# Patient Record
Sex: FEMALE | Race: OTHER | HISPANIC OR LATINO | URBAN - METROPOLITAN AREA
[De-identification: names, ages, dates, MRNs, and addresses within clinical notes are randomized per-mention and may not be internally consistent; named-entity substitution may affect disease eponyms.]

---

## 2017-02-15 ENCOUNTER — INPATIENT (INPATIENT)
Facility: HOSPITAL | Age: 27
LOS: 1 days | Discharge: HOME | DRG: 782 | End: 2017-02-17
Attending: OBSTETRICS & GYNECOLOGY | Admitting: OBSTETRICS & GYNECOLOGY
Payer: COMMERCIAL

## 2017-02-15 VITALS — WEIGHT: 160.94 LBS

## 2017-02-15 VITALS — WEIGHT: 158.73 LBS | HEIGHT: 60 IN

## 2017-02-15 DIAGNOSIS — O44.33 PARTIAL PLACENTA PREVIA WITH HEMORRHAGE, THIRD TRIMESTER: ICD-10-CM

## 2017-02-15 DIAGNOSIS — O44.30: ICD-10-CM

## 2017-02-15 DIAGNOSIS — Z3A.32 32 WEEKS GESTATION OF PREGNANCY: ICD-10-CM

## 2017-02-15 LAB
ALBUMIN SERPL ELPH-MCNC: 2.8 G/DL — LOW (ref 3.4–5)
ALP SERPL-CCNC: 101 U/L — SIGNIFICANT CHANGE UP (ref 40–120)
ALT FLD-CCNC: 11 U/L — LOW (ref 12–42)
ANION GAP SERPL CALC-SCNC: 10 MMOL/L — SIGNIFICANT CHANGE UP (ref 9–16)
APTT BLD: 28.2 SEC — SIGNIFICANT CHANGE UP (ref 27.5–37.4)
AST SERPL-CCNC: 11 U/L — LOW (ref 15–37)
BASOPHILS NFR BLD AUTO: 0.2 % — SIGNIFICANT CHANGE UP (ref 0–2)
BILIRUB SERPL-MCNC: 0.3 MG/DL — SIGNIFICANT CHANGE UP (ref 0.2–1.2)
BLD GP AB SCN SERPL QL: NEGATIVE — SIGNIFICANT CHANGE UP
BUN SERPL-MCNC: 6 MG/DL — LOW (ref 7–23)
CALCIUM SERPL-MCNC: 8.6 MG/DL — SIGNIFICANT CHANGE UP (ref 8.5–10.5)
CHLORIDE SERPL-SCNC: 107 MMOL/L — SIGNIFICANT CHANGE UP (ref 96–108)
CO2 SERPL-SCNC: 22 MMOL/L — SIGNIFICANT CHANGE UP (ref 22–31)
CREAT SERPL-MCNC: 0.52 MG/DL — SIGNIFICANT CHANGE UP (ref 0.5–1.3)
EOSINOPHIL NFR BLD AUTO: 0.5 % — SIGNIFICANT CHANGE UP (ref 0–6)
FIBRINOGEN PPP-MCNC: 708 MG/DL — HIGH (ref 258–438)
GLUCOSE SERPL-MCNC: 90 MG/DL — SIGNIFICANT CHANGE UP (ref 70–99)
HCT VFR BLD CALC: 34.2 % — LOW (ref 34.5–45)
HGB BLD-MCNC: 11.3 G/DL — LOW (ref 11.5–15.5)
INR BLD: 1.04 — SIGNIFICANT CHANGE UP (ref 0.88–1.16)
LYMPHOCYTES # BLD AUTO: 15.7 % — SIGNIFICANT CHANGE UP (ref 13–44)
MCHC RBC-ENTMCNC: 29.8 PG — SIGNIFICANT CHANGE UP (ref 27–34)
MCHC RBC-ENTMCNC: 33 G/DL — SIGNIFICANT CHANGE UP (ref 32–36)
MCV RBC AUTO: 90.2 FL — SIGNIFICANT CHANGE UP (ref 80–100)
MONOCYTES NFR BLD AUTO: 5.5 % — SIGNIFICANT CHANGE UP (ref 2–14)
NEUTROPHILS NFR BLD AUTO: 78.1 % — HIGH (ref 43–77)
PLATELET # BLD AUTO: 229 K/UL — SIGNIFICANT CHANGE UP (ref 150–400)
POTASSIUM SERPL-MCNC: 3.3 MMOL/L — LOW (ref 3.5–5.3)
POTASSIUM SERPL-SCNC: 3.3 MMOL/L — LOW (ref 3.5–5.3)
PROT SERPL-MCNC: 7.3 G/DL — SIGNIFICANT CHANGE UP (ref 6.4–8.2)
PROTHROM AB SERPL-ACNC: 11.5 SEC — SIGNIFICANT CHANGE UP (ref 10–13.1)
RBC # BLD: 3.79 M/UL — LOW (ref 3.8–5.2)
RBC # FLD: 12.8 % — SIGNIFICANT CHANGE UP (ref 10.3–16.9)
RH IG SCN BLD-IMP: POSITIVE — SIGNIFICANT CHANGE UP
RH IG SCN BLD-IMP: POSITIVE — SIGNIFICANT CHANGE UP
SODIUM SERPL-SCNC: 139 MMOL/L — SIGNIFICANT CHANGE UP (ref 135–145)
URATE SERPL-MCNC: 2.9 MG/DL — SIGNIFICANT CHANGE UP (ref 2.6–6)
WBC # BLD: 8.1 K/UL — SIGNIFICANT CHANGE UP (ref 3.8–10.5)
WBC # FLD AUTO: 8.1 K/UL — SIGNIFICANT CHANGE UP (ref 3.8–10.5)

## 2017-02-15 RX ORDER — SODIUM CHLORIDE 9 MG/ML
1000 INJECTION, SOLUTION INTRAVENOUS
Qty: 0 | Refills: 0 | Status: DISCONTINUED | OUTPATIENT
Start: 2017-02-15 | End: 2017-02-16

## 2017-02-15 RX ORDER — SODIUM CHLORIDE 9 MG/ML
500 INJECTION, SOLUTION INTRAVENOUS ONCE
Qty: 0 | Refills: 0 | Status: COMPLETED | OUTPATIENT
Start: 2017-02-15 | End: 2017-02-15

## 2017-02-15 RX ORDER — DOCUSATE SODIUM 100 MG
100 CAPSULE ORAL ONCE
Qty: 0 | Refills: 0 | Status: COMPLETED | OUTPATIENT
Start: 2017-02-15 | End: 2017-02-15

## 2017-02-15 RX ADMIN — Medication 12 MILLIGRAM(S): at 15:00

## 2017-02-15 RX ADMIN — SODIUM CHLORIDE 1000 MILLILITER(S): 9 INJECTION, SOLUTION INTRAVENOUS at 15:16

## 2017-02-15 RX ADMIN — Medication 100 MILLIGRAM(S): at 22:13

## 2017-02-15 RX ADMIN — SODIUM CHLORIDE 125 MILLILITER(S): 9 INJECTION, SOLUTION INTRAVENOUS at 15:16

## 2017-02-16 ENCOUNTER — TRANSCRIPTION ENCOUNTER (OUTPATIENT)
Age: 27
End: 2017-02-16

## 2017-02-16 LAB
APPEARANCE UR: CLEAR — SIGNIFICANT CHANGE UP
BILIRUB UR-MCNC: NEGATIVE — SIGNIFICANT CHANGE UP
COLOR SPEC: YELLOW — SIGNIFICANT CHANGE UP
DIFF PNL FLD: (no result)
GLUCOSE UR QL: NEGATIVE — SIGNIFICANT CHANGE UP
KETONES UR-MCNC: NEGATIVE — SIGNIFICANT CHANGE UP
LEUKOCYTE ESTERASE UR-ACNC: NEGATIVE — SIGNIFICANT CHANGE UP
NITRITE UR-MCNC: NEGATIVE — SIGNIFICANT CHANGE UP
PH UR: 6 — SIGNIFICANT CHANGE UP (ref 4–8)
PROT UR-MCNC: NEGATIVE MG/DL — SIGNIFICANT CHANGE UP
SP GR SPEC: 1.02 — SIGNIFICANT CHANGE UP (ref 1–1.03)
T PALLIDUM AB TITR SER: NEGATIVE — SIGNIFICANT CHANGE UP
UROBILINOGEN FLD QL: 0.2 E.U./DL — SIGNIFICANT CHANGE UP

## 2017-02-16 RX ORDER — FERROUS SULFATE 325(65) MG
325 TABLET ORAL DAILY
Qty: 0 | Refills: 0 | Status: DISCONTINUED | OUTPATIENT
Start: 2017-02-16 | End: 2017-02-17

## 2017-02-16 RX ORDER — DOCUSATE SODIUM 100 MG
100 CAPSULE ORAL
Qty: 0 | Refills: 0 | Status: DISCONTINUED | OUTPATIENT
Start: 2017-02-16 | End: 2017-02-17

## 2017-02-16 RX ORDER — ACETAMINOPHEN 500 MG
650 TABLET ORAL EVERY 6 HOURS
Qty: 0 | Refills: 0 | Status: DISCONTINUED | OUTPATIENT
Start: 2017-02-16 | End: 2017-02-17

## 2017-02-16 RX ADMIN — Medication 30 MILLILITER(S): at 02:30

## 2017-02-16 RX ADMIN — Medication 650 MILLIGRAM(S): at 21:18

## 2017-02-16 RX ADMIN — Medication 650 MILLIGRAM(S): at 22:00

## 2017-02-16 RX ADMIN — Medication 1 TABLET(S): at 10:07

## 2017-02-16 RX ADMIN — Medication 12 MILLIGRAM(S): at 15:09

## 2017-02-16 NOTE — DISCHARGE NOTE ANTEPARTUM - CARE PROVIDER_API CALL
Donna Lentz (MD), Obstetrics and Gynecology  [address] [place] [address]  Phone: [phone]  Fax: [phone]

## 2017-02-16 NOTE — DISCHARGE NOTE ANTEPARTUM - HOSPITAL COURSE
pt admitted for observation after second episode of vaginal bleeding in the setting of known marginal placenta previa, no bleeding noted on physical exam or during admission, initial ctx on monitor resolved after IV and PO hydration,  steroids given, official sonogram revealed resolved marginal previa, pt stable for d/c, precautions given

## 2017-02-16 NOTE — DISCHARGE NOTE ANTEPARTUM - PLAN OF CARE
continue f/u as outpatient w/ Dr. Lentz f/u as instructed per Dr. Lentz, pelvic rest, avoid sexual intercourse

## 2017-02-16 NOTE — DISCHARGE NOTE ANTEPARTUM - CARE PLAN
Principal Discharge DX:	Vaginal bleeding  Goal:	continue f/u as outpatient w/ Dr. Lentz  Instructions for follow-up, activity and diet:	f/u as instructed per Dr. Lentz, pelvic rest, avoid sexual intercourse

## 2017-02-16 NOTE — DISCHARGE NOTE ANTEPARTUM - PATIENT PORTAL LINK FT
“You can access the Follow[employer] Patient Portal, offered by [facility], by registering with the following website: [email or web address]”

## 2017-02-17 LAB
CULTURE RESULTS: NO GROWTH — SIGNIFICANT CHANGE UP
CULTURE RESULTS: SIGNIFICANT CHANGE UP
SPECIMEN SOURCE: SIGNIFICANT CHANGE UP
SPECIMEN SOURCE: SIGNIFICANT CHANGE UP

## 2017-02-17 PROCEDURE — 86850 RBC ANTIBODY SCREEN: CPT

## 2017-02-17 PROCEDURE — 99214 OFFICE O/P EST MOD 30 MIN: CPT

## 2017-02-17 PROCEDURE — 85384 FIBRINOGEN ACTIVITY: CPT

## 2017-02-17 PROCEDURE — 85610 PROTHROMBIN TIME: CPT

## 2017-02-17 PROCEDURE — 87070 CULTURE OTHR SPECIMN AEROBIC: CPT

## 2017-02-17 PROCEDURE — 81003 URINALYSIS AUTO W/O SCOPE: CPT

## 2017-02-17 PROCEDURE — 86780 TREPONEMA PALLIDUM: CPT

## 2017-02-17 PROCEDURE — 81001 URINALYSIS AUTO W/SCOPE: CPT

## 2017-02-17 PROCEDURE — 86900 BLOOD TYPING SEROLOGIC ABO: CPT

## 2017-02-17 PROCEDURE — 80053 COMPREHEN METABOLIC PANEL: CPT

## 2017-02-17 PROCEDURE — 85730 THROMBOPLASTIN TIME PARTIAL: CPT

## 2017-02-17 PROCEDURE — 87086 URINE CULTURE/COLONY COUNT: CPT

## 2017-02-17 PROCEDURE — 84550 ASSAY OF BLOOD/URIC ACID: CPT

## 2017-02-17 PROCEDURE — 86901 BLOOD TYPING SEROLOGIC RH(D): CPT

## 2017-02-17 PROCEDURE — 36415 COLL VENOUS BLD VENIPUNCTURE: CPT

## 2017-02-17 PROCEDURE — 85025 COMPLETE CBC W/AUTO DIFF WBC: CPT

## 2017-02-17 RX ADMIN — Medication 325 MILLIGRAM(S): at 11:44

## 2017-02-17 RX ADMIN — Medication 100 MILLIGRAM(S): at 06:02

## 2017-02-17 RX ADMIN — Medication 1 TABLET(S): at 11:44

## 2017-02-22 DIAGNOSIS — O44.20 PARTIAL PLACENTA PREVIA NOS OR WITHOUT HEMORRHAGE, UNSPECIFIED TRIMESTER: ICD-10-CM

## 2017-02-22 DIAGNOSIS — Z34.80 ENCOUNTER FOR SUPERVISION OF OTHER NORMAL PREGNANCY, UNSPECIFIED TRIMESTER: ICD-10-CM

## 2017-02-22 DIAGNOSIS — Z28.21 IMMUNIZATION NOT CARRIED OUT BECAUSE OF PATIENT REFUSAL: ICD-10-CM

## 2017-03-30 ENCOUNTER — OUTPATIENT (OUTPATIENT)
Dept: OUTPATIENT SERVICES | Facility: HOSPITAL | Age: 27
LOS: 1 days | End: 2017-03-30
Payer: COMMERCIAL

## 2017-03-30 ENCOUNTER — RESULT REVIEW (OUTPATIENT)
Age: 27
End: 2017-03-30

## 2017-03-30 DIAGNOSIS — Z01.818 ENCOUNTER FOR OTHER PREPROCEDURAL EXAMINATION: ICD-10-CM

## 2017-03-30 LAB
ALBUMIN SERPL ELPH-MCNC: 2.8 G/DL — LOW (ref 3.4–5)
ALP SERPL-CCNC: 185 U/L — HIGH (ref 40–120)
ALT FLD-CCNC: 9 U/L — LOW (ref 12–42)
ANION GAP SERPL CALC-SCNC: 8 MMOL/L — LOW (ref 9–16)
APTT BLD: 27.7 SEC — SIGNIFICANT CHANGE UP (ref 27.5–37.4)
AST SERPL-CCNC: 11 U/L — LOW (ref 15–37)
BILIRUB SERPL-MCNC: 0.3 MG/DL — SIGNIFICANT CHANGE UP (ref 0.2–1.2)
BLD GP AB SCN SERPL QL: NEGATIVE — SIGNIFICANT CHANGE UP
BUN SERPL-MCNC: 5 MG/DL — LOW (ref 7–23)
CALCIUM SERPL-MCNC: 8.8 MG/DL — SIGNIFICANT CHANGE UP (ref 8.5–10.5)
CHLORIDE SERPL-SCNC: 105 MMOL/L — SIGNIFICANT CHANGE UP (ref 96–108)
CO2 SERPL-SCNC: 26 MMOL/L — SIGNIFICANT CHANGE UP (ref 22–31)
CREAT SERPL-MCNC: 0.59 MG/DL — SIGNIFICANT CHANGE UP (ref 0.5–1.3)
GLUCOSE SERPL-MCNC: 104 MG/DL — HIGH (ref 70–99)
HCT VFR BLD CALC: 35.4 % — SIGNIFICANT CHANGE UP (ref 34.5–45)
HGB BLD-MCNC: 11.9 G/DL — SIGNIFICANT CHANGE UP (ref 11.5–15.5)
INR BLD: 0.98 — SIGNIFICANT CHANGE UP (ref 0.88–1.16)
MCHC RBC-ENTMCNC: 30 PG — SIGNIFICANT CHANGE UP (ref 27–34)
MCHC RBC-ENTMCNC: 33.6 G/DL — SIGNIFICANT CHANGE UP (ref 32–36)
MCV RBC AUTO: 89.2 FL — SIGNIFICANT CHANGE UP (ref 80–100)
PLATELET # BLD AUTO: 225 K/UL — SIGNIFICANT CHANGE UP (ref 150–400)
POTASSIUM SERPL-MCNC: 3.8 MMOL/L — SIGNIFICANT CHANGE UP (ref 3.5–5.3)
POTASSIUM SERPL-SCNC: 3.8 MMOL/L — SIGNIFICANT CHANGE UP (ref 3.5–5.3)
PROT SERPL-MCNC: 6.8 G/DL — SIGNIFICANT CHANGE UP (ref 6.4–8.2)
PROTHROM AB SERPL-ACNC: 10.9 SEC — SIGNIFICANT CHANGE UP (ref 9.8–12.7)
RBC # BLD: 3.97 M/UL — SIGNIFICANT CHANGE UP (ref 3.8–5.2)
RBC # FLD: 13.3 % — SIGNIFICANT CHANGE UP (ref 10.3–16.9)
RH IG SCN BLD-IMP: POSITIVE — SIGNIFICANT CHANGE UP
SODIUM SERPL-SCNC: 139 MMOL/L — SIGNIFICANT CHANGE UP (ref 135–145)
T PALLIDUM AB TITR SER: NEGATIVE — SIGNIFICANT CHANGE UP
WBC # BLD: 8.1 K/UL — SIGNIFICANT CHANGE UP (ref 3.8–10.5)
WBC # FLD AUTO: 8.1 K/UL — SIGNIFICANT CHANGE UP (ref 3.8–10.5)

## 2017-03-31 ENCOUNTER — TRANSCRIPTION ENCOUNTER (OUTPATIENT)
Age: 27
End: 2017-03-31

## 2017-03-31 ENCOUNTER — INPATIENT (INPATIENT)
Facility: HOSPITAL | Age: 27
LOS: 1 days | Discharge: HOME | End: 2017-04-02
Attending: OBSTETRICS & GYNECOLOGY | Admitting: OBSTETRICS & GYNECOLOGY
Payer: COMMERCIAL

## 2017-03-31 VITALS — WEIGHT: 160.94 LBS | HEIGHT: 60 IN

## 2017-03-31 PROCEDURE — 85730 THROMBOPLASTIN TIME PARTIAL: CPT

## 2017-03-31 PROCEDURE — 80053 COMPREHEN METABOLIC PANEL: CPT

## 2017-03-31 PROCEDURE — 86901 BLOOD TYPING SEROLOGIC RH(D): CPT

## 2017-03-31 PROCEDURE — 86780 TREPONEMA PALLIDUM: CPT

## 2017-03-31 PROCEDURE — 86900 BLOOD TYPING SEROLOGIC ABO: CPT

## 2017-03-31 PROCEDURE — 85027 COMPLETE CBC AUTOMATED: CPT

## 2017-03-31 PROCEDURE — 85610 PROTHROMBIN TIME: CPT

## 2017-03-31 PROCEDURE — 86850 RBC ANTIBODY SCREEN: CPT

## 2017-03-31 RX ORDER — OXYTOCIN 10 UNIT/ML
41.67 VIAL (ML) INJECTION
Qty: 20 | Refills: 0 | Status: DISCONTINUED | OUTPATIENT
Start: 2017-03-31 | End: 2017-04-02

## 2017-03-31 RX ORDER — DIPHENHYDRAMINE HCL 50 MG
25 CAPSULE ORAL EVERY 6 HOURS
Qty: 0 | Refills: 0 | Status: DISCONTINUED | OUTPATIENT
Start: 2017-03-31 | End: 2017-04-02

## 2017-03-31 RX ORDER — HEPARIN SODIUM 5000 [USP'U]/ML
5000 INJECTION INTRAVENOUS; SUBCUTANEOUS EVERY 12 HOURS
Qty: 0 | Refills: 0 | Status: DISCONTINUED | OUTPATIENT
Start: 2017-04-01 | End: 2017-04-02

## 2017-03-31 RX ORDER — CITRIC ACID/SODIUM CITRATE 300-500 MG
30 SOLUTION, ORAL ORAL ONCE
Qty: 0 | Refills: 0 | Status: COMPLETED | OUTPATIENT
Start: 2017-03-31 | End: 2017-03-31

## 2017-03-31 RX ORDER — SODIUM CHLORIDE 9 MG/ML
1000 INJECTION, SOLUTION INTRAVENOUS
Qty: 0 | Refills: 0 | Status: DISCONTINUED | OUTPATIENT
Start: 2017-03-31 | End: 2017-03-31

## 2017-03-31 RX ORDER — GLYCERIN ADULT
1 SUPPOSITORY, RECTAL RECTAL AT BEDTIME
Qty: 0 | Refills: 0 | Status: DISCONTINUED | OUTPATIENT
Start: 2017-03-31 | End: 2017-04-02

## 2017-03-31 RX ORDER — ONDANSETRON 8 MG/1
4 TABLET, FILM COATED ORAL EVERY 6 HOURS
Qty: 0 | Refills: 0 | Status: DISCONTINUED | OUTPATIENT
Start: 2017-03-31 | End: 2017-03-31

## 2017-03-31 RX ORDER — SODIUM CHLORIDE 9 MG/ML
1000 INJECTION, SOLUTION INTRAVENOUS ONCE
Qty: 0 | Refills: 0 | Status: COMPLETED | OUTPATIENT
Start: 2017-03-31 | End: 2017-03-31

## 2017-03-31 RX ORDER — NALOXONE HYDROCHLORIDE 4 MG/.1ML
0.1 SPRAY NASAL
Qty: 0 | Refills: 0 | Status: DISCONTINUED | OUTPATIENT
Start: 2017-03-31 | End: 2017-03-31

## 2017-03-31 RX ORDER — IBUPROFEN 200 MG
600 TABLET ORAL EVERY 6 HOURS
Qty: 0 | Refills: 0 | Status: DISCONTINUED | OUTPATIENT
Start: 2017-03-31 | End: 2017-04-02

## 2017-03-31 RX ORDER — LANOLIN
1 OINTMENT (GRAM) TOPICAL
Qty: 0 | Refills: 0 | Status: DISCONTINUED | OUTPATIENT
Start: 2017-03-31 | End: 2017-04-02

## 2017-03-31 RX ORDER — CEFAZOLIN SODIUM 1 G
2000 VIAL (EA) INJECTION ONCE
Qty: 0 | Refills: 0 | Status: COMPLETED | OUTPATIENT
Start: 2017-03-31 | End: 2017-03-31

## 2017-03-31 RX ORDER — DOCUSATE SODIUM 100 MG
100 CAPSULE ORAL
Qty: 0 | Refills: 0 | Status: DISCONTINUED | OUTPATIENT
Start: 2017-03-31 | End: 2017-04-02

## 2017-03-31 RX ORDER — ACETAMINOPHEN 500 MG
650 TABLET ORAL EVERY 6 HOURS
Qty: 0 | Refills: 0 | Status: DISCONTINUED | OUTPATIENT
Start: 2017-03-31 | End: 2017-04-02

## 2017-03-31 RX ORDER — METOCLOPRAMIDE HCL 10 MG
10 TABLET ORAL ONCE
Qty: 0 | Refills: 0 | Status: DISCONTINUED | OUTPATIENT
Start: 2017-03-31 | End: 2017-03-31

## 2017-03-31 RX ORDER — TETANUS TOXOID, REDUCED DIPHTHERIA TOXOID AND ACELLULAR PERTUSSIS VACCINE, ADSORBED 5; 2.5; 8; 8; 2.5 [IU]/.5ML; [IU]/.5ML; UG/.5ML; UG/.5ML; UG/.5ML
0.5 SUSPENSION INTRAMUSCULAR ONCE
Qty: 0 | Refills: 0 | Status: DISCONTINUED | OUTPATIENT
Start: 2017-03-31 | End: 2017-04-02

## 2017-03-31 RX ORDER — SODIUM CHLORIDE 9 MG/ML
1000 INJECTION, SOLUTION INTRAVENOUS
Qty: 0 | Refills: 0 | Status: DISCONTINUED | OUTPATIENT
Start: 2017-03-31 | End: 2017-04-02

## 2017-03-31 RX ORDER — SIMETHICONE 80 MG/1
80 TABLET, CHEWABLE ORAL EVERY 4 HOURS
Qty: 0 | Refills: 0 | Status: DISCONTINUED | OUTPATIENT
Start: 2017-03-31 | End: 2017-04-02

## 2017-03-31 RX ORDER — FERROUS SULFATE 325(65) MG
325 TABLET ORAL DAILY
Qty: 0 | Refills: 0 | Status: DISCONTINUED | OUTPATIENT
Start: 2017-03-31 | End: 2017-04-02

## 2017-03-31 RX ORDER — IBUPROFEN 200 MG
600 TABLET ORAL EVERY 6 HOURS
Qty: 0 | Refills: 0 | Status: DISCONTINUED | OUTPATIENT
Start: 2017-03-31 | End: 2017-03-31

## 2017-03-31 RX ORDER — OXYTOCIN 10 UNIT/ML
333.33 VIAL (ML) INJECTION
Qty: 20 | Refills: 0 | Status: COMPLETED | OUTPATIENT
Start: 2017-03-31 | End: 2017-03-31

## 2017-03-31 RX ADMIN — Medication 1000 MILLIUNIT(S)/MIN: at 09:59

## 2017-03-31 RX ADMIN — Medication 30 MILLILITER(S): at 07:37

## 2017-03-31 RX ADMIN — SODIUM CHLORIDE 2000 MILLILITER(S): 9 INJECTION, SOLUTION INTRAVENOUS at 06:20

## 2017-03-31 RX ADMIN — Medication 125 MILLIUNIT(S)/MIN: at 10:16

## 2017-03-31 RX ADMIN — Medication 600 MILLIGRAM(S): at 22:08

## 2017-03-31 RX ADMIN — Medication 100 MILLIGRAM(S): at 07:37

## 2017-03-31 RX ADMIN — Medication 125 MILLIUNIT(S)/MIN: at 10:00

## 2017-03-31 RX ADMIN — SODIUM CHLORIDE 125 MILLILITER(S): 9 INJECTION, SOLUTION INTRAVENOUS at 06:48

## 2017-03-31 RX ADMIN — Medication 600 MILLIGRAM(S): at 21:16

## 2017-03-31 RX ADMIN — SODIUM CHLORIDE 125 MILLILITER(S): 9 INJECTION, SOLUTION INTRAVENOUS at 07:37

## 2017-03-31 NOTE — DISCHARGE NOTE OB - CARE PLAN
Principal Discharge DX:	 delivery delivered  Goal:	healthy recovery  Instructions for follow-up, activity and diet:	pelvc rest, rtc 2 wk post partum

## 2017-03-31 NOTE — DISCHARGE NOTE OB - CARE PROVIDER_API CALL
Donna Lentz (MD), Obstetrics and Gynecology  [address] [place] [address]  Phone: [phone]  Fax: [phone]

## 2017-03-31 NOTE — DISCHARGE NOTE OB - PATIENT PORTAL LINK FT
“You can access the Follow[employer] Patient Portal, offered by [facility], by registering with the following website: [email or web address]”

## 2017-04-01 LAB
HCT VFR BLD CALC: 30.5 % — LOW (ref 34.5–45)
HGB BLD-MCNC: 10.3 G/DL — LOW (ref 11.5–15.5)
MCHC RBC-ENTMCNC: 29.9 PG — SIGNIFICANT CHANGE UP (ref 27–34)
MCHC RBC-ENTMCNC: 33.8 G/DL — SIGNIFICANT CHANGE UP (ref 32–36)
MCV RBC AUTO: 88.4 FL — SIGNIFICANT CHANGE UP (ref 80–100)
PLATELET # BLD AUTO: 187 K/UL — SIGNIFICANT CHANGE UP (ref 150–400)
RBC # BLD: 3.45 M/UL — LOW (ref 3.8–5.2)
RBC # FLD: 13.3 % — SIGNIFICANT CHANGE UP (ref 10.3–16.9)
WBC # BLD: 8 K/UL — SIGNIFICANT CHANGE UP (ref 3.8–10.5)
WBC # FLD AUTO: 8 K/UL — SIGNIFICANT CHANGE UP (ref 3.8–10.5)

## 2017-04-01 RX ADMIN — Medication 600 MILLIGRAM(S): at 12:34

## 2017-04-01 RX ADMIN — Medication 325 MILLIGRAM(S): at 12:34

## 2017-04-01 RX ADMIN — HEPARIN SODIUM 5000 UNIT(S): 5000 INJECTION INTRAVENOUS; SUBCUTANEOUS at 18:00

## 2017-04-01 RX ADMIN — Medication 600 MILLIGRAM(S): at 18:30

## 2017-04-01 RX ADMIN — Medication 600 MILLIGRAM(S): at 13:15

## 2017-04-01 RX ADMIN — Medication 100 MILLIGRAM(S): at 12:34

## 2017-04-01 RX ADMIN — Medication 1 APPLICATION(S): at 12:34

## 2017-04-01 RX ADMIN — Medication 600 MILLIGRAM(S): at 03:45

## 2017-04-01 RX ADMIN — HEPARIN SODIUM 5000 UNIT(S): 5000 INJECTION INTRAVENOUS; SUBCUTANEOUS at 05:17

## 2017-04-01 RX ADMIN — Medication 600 MILLIGRAM(S): at 02:46

## 2017-04-01 RX ADMIN — Medication 600 MILLIGRAM(S): at 17:58

## 2017-04-01 RX ADMIN — Medication 1 TABLET(S): at 12:34

## 2017-04-01 NOTE — PROGRESS NOTE ADULT - SUBJECTIVE AND OBJECTIVE BOX
Patient evaluated at bedside. She has been ambulating without assistance, voiding spontaneously, passing gas, tolerating clear diet and is breastfeeding.  She reports pain is well controlled with motrin/percocet  She denies headache, dizziness, chest pain, palpitations, shortness of breathe, nausea, vomiting or heavy vaginal bleeding.      Physical Exam:  Vital Signs Last 24 Hrs  T(C): 36.3, Max: 37.2 (03-31 @ 22:00)  T(F): 97.3, Max: 98.9 (03-31 @ 22:00)  HR: 76 (60 - 84)  BP: 99/62 (93/55 - 106/56)  BP(mean): --  RR: 17 (15 - 18)  SpO2: 98% (93% - 100%)    Constitutional: Alert & Oriented x3, No acute distress, cooperative   Gastrointestinal: soft, mildy tender, minimally distended, no rebound or guarding; uterus firm at midline,  2 fb below umbilicus  Incision: steristrips in place; area clean, dry and intact; no erythema or induration   : s/p waldo jung WNL   Extremities: no calf tenderness or swelling                          10.3   8.0   )-----------( 187      ( 01 Apr 2017 07:41 )             30.5     30 Mar 2017 12:28    139    |  105    |  5      ----------------------------<  104    3.8     |  26     |  0.59     Ca    8.8        30 Mar 2017 12:28    TPro  6.8    /  Alb  2.8    /  TBili  0.3    /  DBili  x      /  AST  11     /  ALT  9      /  AlkPhos  185    30 Mar 2017 12:28      PT/INR - ( 30 Mar 2017 12:28 )   PT: 10.9 sec;   INR: 0.98          PTT - ( 30 Mar 2017 12:28 )  PTT:27.7 sec      A/P   27y  s/p c/s, POD #1, stable  -  Pain: PO motrin, percocet  -  GI: tolerating clears, passing gas, ADAT  -  : s/p jung   -  DVT prophylaxis: ambulation,  SQH  -  Dispo: POD 3 or 4

## 2017-04-01 NOTE — PROGRESS NOTE ADULT - ASSESSMENT
A/P   27y  s/p c/s, POD #1, stable  -  Pain: PO motrin, percocet  -  GI: tolerating clears, passing gas, ADAT  -  : jung in situ; DC this AM, f/u TOV  -  DVT prophylaxis: ambulation, SCDs, SQH  -  Dispo: POD 3 or 4

## 2017-04-01 NOTE — PROGRESS NOTE ADULT - SUBJECTIVE AND OBJECTIVE BOX
Patient evaluated at bedside.   She reports pain is well controlled with PO medication.  She denies nausea, vomiting or heavy vaginal bleeding.  She has been ambulating minimally, voiding spontaneously, passing gas, tolerating clears and is breastfeeding.    Physical Exam:  Vital Signs Last 24 Hrs  T(C): 36.3, Max: 37.2 (03-31 @ 22:00)  T(F): 97.3, Max: 98.9 (03-31 @ 22:00)  HR: 76 (60 - 84)  BP: 99/62 (93/55 - 106/56)  BP(mean): --  RR: 17 (15 - 18)  SpO2: 98% (93% - 100%)    GA: NAD, A+0 x 3  Abd: + BS, soft, nontender, nondistended, no rebound or guarding, incision clean, dry and intact, pressure dressing removed, steristrips in place, uterus firm at midline, 2 fb below umbilicus  : lochia WNL  Extremities: no swelling or calf tenderness                            10.3   8.0   )-----------( 187      ( 01 Apr 2017 07:41 )             30.5     30 Mar 2017 12:28    139    |  105    |  5      ----------------------------<  104    3.8     |  26     |  0.59     Ca    8.8        30 Mar 2017 12:28    TPro  6.8    /  Alb  2.8    /  TBili  0.3    /  DBili  x      /  AST  11     /  ALT  9      /  AlkPhos  185    30 Mar 2017 12:28      PT/INR - ( 30 Mar 2017 12:28 )   PT: 10.9 sec;   INR: 0.98          PTT - ( 30 Mar 2017 12:28 )  PTT:27.7 sec

## 2017-04-02 VITALS
TEMPERATURE: 98 F | DIASTOLIC BLOOD PRESSURE: 67 MMHG | HEART RATE: 95 BPM | RESPIRATION RATE: 18 BRPM | OXYGEN SATURATION: 97 % | SYSTOLIC BLOOD PRESSURE: 96 MMHG

## 2017-04-02 PROCEDURE — 88307 TISSUE EXAM BY PATHOLOGIST: CPT

## 2017-04-02 PROCEDURE — 88300 SURGICAL PATH GROSS: CPT

## 2017-04-02 PROCEDURE — 85027 COMPLETE CBC AUTOMATED: CPT

## 2017-04-02 PROCEDURE — C1765: CPT

## 2017-04-02 PROCEDURE — 36415 COLL VENOUS BLD VENIPUNCTURE: CPT

## 2017-04-02 RX ADMIN — Medication 600 MILLIGRAM(S): at 14:15

## 2017-04-02 RX ADMIN — Medication 600 MILLIGRAM(S): at 00:15

## 2017-04-02 RX ADMIN — Medication 325 MILLIGRAM(S): at 09:54

## 2017-04-02 RX ADMIN — Medication 600 MILLIGRAM(S): at 06:09

## 2017-04-02 RX ADMIN — Medication 600 MILLIGRAM(S): at 01:20

## 2017-04-02 RX ADMIN — Medication 100 MILLIGRAM(S): at 00:15

## 2017-04-02 RX ADMIN — Medication 1 TABLET(S): at 09:55

## 2017-04-02 RX ADMIN — HEPARIN SODIUM 5000 UNIT(S): 5000 INJECTION INTRAVENOUS; SUBCUTANEOUS at 06:09

## 2017-04-02 RX ADMIN — Medication 600 MILLIGRAM(S): at 07:10

## 2017-04-02 RX ADMIN — Medication 600 MILLIGRAM(S): at 13:34

## 2017-04-02 NOTE — PROGRESS NOTE ADULT - SUBJECTIVE AND OBJECTIVE BOX
Patient evaluated at bedside.   She reports pain is well controlled with motrin and percocet  She denies heavy vaginal bleeding.  She has been ambulating without assistance, voiding spontaneously, passing gas, tolerating regular diet and is breastfeeding.    Physical Exam:  Vital Signs Last 24 Hrs  T(C): 36.9, Max: 36.9 (04-01 @ 12:31)  T(F): 98.5, Max: 98.5 (04-01 @ 12:31)  HR: 87 (76 - 96)  BP: 94/65 (94/65 - 103/66)  BP(mean): --  RR: 16 (16 - 17)  SpO2: 97% (97% - 100%)    GA: NAD, A+0 x 3  Abd: soft, nontender, nondistended, no rebound or guarding, incision clean, dry and intact, uterus firm at midline, 0 fb below umbilicus  : lochia WNL  Extremities: no calf tenderness                            10.3   8.0   )-----------( 187      ( 01 Apr 2017 07:41 )             30.5

## 2017-04-02 NOTE — PROGRESS NOTE ADULT - ASSESSMENT
A/P  26yo s/p c/s, POD #2  ,stable.   1. Pain: well controlled on PO pain meds  2. GI: regular   3. : voiding  4. DVT prophylaxis: SQH, ambulation  5. Dispo: POD 3 or 4

## 2017-04-05 DIAGNOSIS — Z34.83 ENCOUNTER FOR SUPERVISION OF OTHER NORMAL PREGNANCY, THIRD TRIMESTER: ICD-10-CM

## 2017-04-05 DIAGNOSIS — N85.8 OTHER SPECIFIED NONINFLAMMATORY DISORDERS OF UTERUS: ICD-10-CM

## 2017-04-05 DIAGNOSIS — O34.211 MATERNAL CARE FOR LOW TRANSVERSE SCAR FROM PREVIOUS CESAREAN DELIVERY: ICD-10-CM

## 2017-04-05 DIAGNOSIS — Z3A.39 39 WEEKS GESTATION OF PREGNANCY: ICD-10-CM

## 2017-04-07 LAB — SURGICAL PATHOLOGY STUDY: SIGNIFICANT CHANGE UP

## 2017-07-31 ENCOUNTER — RESULT REVIEW (OUTPATIENT)
Age: 27
End: 2017-07-31

## 2017-08-31 ENCOUNTER — APPOINTMENT (OUTPATIENT)
Dept: INTERNAL MEDICINE | Facility: CLINIC | Age: 27
End: 2017-08-31

## 2017-09-17 ENCOUNTER — FORM ENCOUNTER (OUTPATIENT)
Age: 27
End: 2017-09-17

## 2017-09-18 ENCOUNTER — OUTPATIENT (OUTPATIENT)
Dept: OUTPATIENT SERVICES | Facility: HOSPITAL | Age: 27
LOS: 1 days | End: 2017-09-18
Payer: COMMERCIAL

## 2017-09-18 PROCEDURE — 73630 X-RAY EXAM OF FOOT: CPT

## 2017-09-18 PROCEDURE — 73630 X-RAY EXAM OF FOOT: CPT | Mod: 26,50

## 2017-11-08 ENCOUNTER — LABORATORY RESULT (OUTPATIENT)
Age: 27
End: 2017-11-08

## 2017-11-08 ENCOUNTER — APPOINTMENT (OUTPATIENT)
Dept: INTERNAL MEDICINE | Facility: CLINIC | Age: 27
End: 2017-11-08
Payer: COMMERCIAL

## 2017-11-08 VITALS
SYSTOLIC BLOOD PRESSURE: 106 MMHG | WEIGHT: 142 LBS | HEIGHT: 59 IN | OXYGEN SATURATION: 98 % | BODY MASS INDEX: 28.63 KG/M2 | DIASTOLIC BLOOD PRESSURE: 70 MMHG | HEART RATE: 68 BPM | TEMPERATURE: 97.9 F | RESPIRATION RATE: 12 BRPM

## 2017-11-08 DIAGNOSIS — Z00.00 ENCOUNTER FOR GENERAL ADULT MEDICAL EXAMINATION W/OUT ABNORMAL FINDINGS: ICD-10-CM

## 2017-11-08 PROCEDURE — 99395 PREV VISIT EST AGE 18-39: CPT | Mod: 25

## 2017-11-08 PROCEDURE — 36415 COLL VENOUS BLD VENIPUNCTURE: CPT

## 2017-11-15 LAB
ALBUMIN SERPL ELPH-MCNC: 4.4 G/DL
ALP BLD-CCNC: 62 U/L
ALT SERPL-CCNC: 7 U/L
ANION GAP SERPL CALC-SCNC: 17 MMOL/L
AST SERPL-CCNC: 24 U/L
BASOPHILS # BLD AUTO: 0.01 K/UL
BASOPHILS NFR BLD AUTO: 0.1 %
BILIRUB SERPL-MCNC: 0.3 MG/DL
BUN SERPL-MCNC: 8 MG/DL
CALCIUM SERPL-MCNC: 9.1 MG/DL
CHLORIDE SERPL-SCNC: 102 MMOL/L
CHOLEST SERPL-MCNC: 179 MG/DL
CHOLEST/HDLC SERPL: 3.5 RATIO
CO2 SERPL-SCNC: 22 MMOL/L
CREAT SERPL-MCNC: 0.66 MG/DL
EOSINOPHIL # BLD AUTO: 0.05 K/UL
EOSINOPHIL NFR BLD AUTO: 0.7 %
GLUCOSE SERPL-MCNC: 65 MG/DL
HBA1C MFR BLD HPLC: 5.3 %
HCT VFR BLD CALC: 40.2 %
HDLC SERPL-MCNC: 51 MG/DL
HGB BLD-MCNC: 12.7 G/DL
IMM GRANULOCYTES NFR BLD AUTO: 0.1 %
LDLC SERPL CALC-MCNC: 109 MG/DL
LYMPHOCYTES # BLD AUTO: 1.85 K/UL
LYMPHOCYTES NFR BLD AUTO: 24.8 %
MAN DIFF?: NORMAL
MCHC RBC-ENTMCNC: 29.5 PG
MCHC RBC-ENTMCNC: 31.6 GM/DL
MCV RBC AUTO: 93.3 FL
MONOCYTES # BLD AUTO: 0.48 K/UL
MONOCYTES NFR BLD AUTO: 6.4 %
NEUTROPHILS # BLD AUTO: 5.06 K/UL
NEUTROPHILS NFR BLD AUTO: 67.9 %
PLATELET # BLD AUTO: 282 K/UL
POTASSIUM SERPL-SCNC: 4.4 MMOL/L
PROT SERPL-MCNC: 8 G/DL
RBC # BLD: 4.31 M/UL
RBC # FLD: 13.4 %
SODIUM SERPL-SCNC: 141 MMOL/L
TRIGL SERPL-MCNC: 97 MG/DL
TSH SERPL-ACNC: 1.23 UIU/ML
WBC # FLD AUTO: 7.46 K/UL

## 2017-12-05 ENCOUNTER — OTHER (OUTPATIENT)
Age: 27
End: 2017-12-05

## 2021-09-03 NOTE — DISCHARGE NOTE OB - AVOID SITTING IN ONE POSITION FOR MORE THAN ONE HOUR
Pt ate dinner and was delayed in going up until now.  Nurse from [facility] called and stated the Pt can come up now.  Transport arranged.    Statement Selected

## 2025-06-11 NOTE — DISCHARGE NOTE ANTEPARTUM - NS AS DC STROKE DX YN
Refill Routing Note   Medication(s) are not appropriate for processing by [facility] for the following reason(s):        Outside of protocol    [facility] action(s):  Route      Medication Therapy Plan: Unknown pregnancy status      Appointments  past 12m or future 3m with PCP    Date Provider   Last Visit   6/5/2025 Brian Iasbel MD   Next Visit   6/10/2025 Brian Isabel MD   ED visits in past 90 days: 0        Note composed:12:18 PM 06/11/2025                
no